# Patient Record
Sex: MALE | Race: WHITE
[De-identification: names, ages, dates, MRNs, and addresses within clinical notes are randomized per-mention and may not be internally consistent; named-entity substitution may affect disease eponyms.]

---

## 2019-12-17 ENCOUNTER — HOSPITAL ENCOUNTER (EMERGENCY)
Dept: HOSPITAL 97 - ER | Age: 44
Discharge: HOME | End: 2019-12-17
Payer: SELF-PAY

## 2019-12-17 VITALS — DIASTOLIC BLOOD PRESSURE: 81 MMHG | OXYGEN SATURATION: 100 % | SYSTOLIC BLOOD PRESSURE: 118 MMHG | TEMPERATURE: 99.8 F

## 2019-12-17 DIAGNOSIS — B02.9: Primary | ICD-10-CM

## 2019-12-17 DIAGNOSIS — Z21: ICD-10-CM

## 2019-12-17 DIAGNOSIS — F17.210: ICD-10-CM

## 2019-12-17 PROCEDURE — 99283 EMERGENCY DEPT VISIT LOW MDM: CPT

## 2019-12-17 NOTE — EDPHYS
Physician Documentation                                                                           

 [facility]                                                                 

Name: Josh Gtz                                                                              

Age: 44 yrs                                                                                       

Sex: Male                                                                                         

: 1975                                                                                   

MRN: [id number]                                                                                   

Arrival Date: 2019                                                                          

Time: 13:16                                                                                       

Account#: [id number]                                                                            

Bed 13                                                                                            

Private MD:                                                                                       

ED Physician Kaiden Seo                                                                      

HPI:                                                                                              

                                                                                             

14:06 This 44 yrs old  Male presents to ER via Ambulatory with complaints of Rash -  wa  

      on neck.                                                                                    

14:06 The patient's rash thought to be caused by an unknown cause. The rash is located on the wa  

      L scalp. L lateral neck, and over the L clavicular fossa area. The rash can be              

      described as erythematous, patchy, raised, vesicular. Onset: The symptoms/episode           

      began/occurred 2 day(s) ago. Associated signs and symptoms: Pertinent positives:            

      burning sensation, Pain Pertinent negatives: difficulty breathing, fever, itching.          

      Severity of symptoms: At their worst the symptoms were moderate in the emergency            

      department the symptoms are unchanged. Treatment given at home: none. The patient has       

      experienced a previous episode. The patient has not recently seen a physician. h/o HIV.     

      on HAART. Viral load undetectable. .                                                        

                                                                                                  

Historical:                                                                                       

- Allergies:                                                                                      

13:25 No Known Allergies;                                                                     tw2 

- Home Meds:                                                                                      

13:25 Tivicay 50 mg oral tab 1 tab once daily [Active]; norvair [Active]; Prezista oral oral  tw2 

      [Active];                                                                                   

- PMHx:                                                                                           

13:25 HIV;                                                                                    tw2 

- PSHx:                                                                                           

13:25 Appendectomy;                                                                           tw2 

                                                                                                  

- Immunization history:: Adult Immunizations Last tetanus immunization: < 5 years ago.            

- Social history:: Smoking status: Patient uses tobacco products, smokes one pack                 

  cigarettes per day. Patient uses street drugs, Methamphetamine (Meth) "i used                   

  sometime last week".                                                                            

- Ebola Screening: : Patient denies travel to an Ebola-affected area in the 21 days               

  before illness onset.                                                                           

- Family history:: not pertinent.                                                                 

- Hospitalizations: : No recent hospitalization is reported.                                      

                                                                                                  

                                                                                                  

ROS:                                                                                              

14:15 Constitutional: Negative for fever, chills, and weight loss, Eyes: Negative for injury, wa  

      pain, redness, and discharge, ENT: Negative for injury, pain, and discharge, Neck:          

      Negative for injury, pain, and swelling, Cardiovascular: Negative for chest pain,           

      palpitations, and edema, Respiratory: Negative for shortness of breath, cough,              

      wheezing, and pleuritic chest pain, Abdomen/GI: Negative for abdominal pain, nausea,        

      vomiting, diarrhea, and constipation, Back: Negative for injury and pain, MS/Extremity:     

      Negative for injury and deformity, Neuro: Negative for headache, weakness, numbness,        

      tingling, and seizure, Psych: Negative for depression, anxiety, suicide ideation,           

      homicidal ideation, and hallucinations.                                                     

14:15 Skin: Positive for rash, of the left temple, left jaw, left frontal area, left temporal     

      area, left occipital area, left ear and left base of the skull, vesicular, raised.          

      erythematous. tender to touch.                                                              

                                                                                                  

Exam:                                                                                             

14:17 Constitutional:  This is a well developed, well nourished patient who is awake, alert,  wa  

      and in no acute distress. Head/Face:  Normocephalic, atraumatic. Eyes:  Pupils equal        

      round and reactive to light, extra-ocular motions intact.  Lids and lashes normal.          

      Conjunctiva and sclera are non-icteric and not injected.  Cornea within normal limits.      

      Periorbital areas with no swelling, redness, or edema. ENT:  Nares patent. No nasal         

      discharge, no septal abnormalities noted.  Tympanic membranes are normal and external       

      auditory canals are clear.  Oropharynx with no redness, swelling, or masses, exudates,      

      or evidence of obstruction, uvula midline.  Mucous membranes moist. Neck:  Trachea          

      midline, no thyromegaly or masses palpated, and no cervical lymphadenopathy.  Supple,       

      full range of motion without nuchal rigidity, or vertebral point tenderness.  No            

      Meningismus. Chest/axilla:  Normal chest wall appearance and motion.  Nontender with no     

      deformity.  No lesions are appreciated. Cardiovascular:  Regular rate and rhythm with a     

      normal S1 and S2.  No gallops, murmurs, or rubs.  Normal PMI, no JVD.  No pulse             

      deficits. Respiratory:  Lungs have equal breath sounds bilaterally, clear to                

      auscultation and percussion.  No rales, rhonchi or wheezes noted.  No increased work of     

      breathing, no retractions or nasal flaring. Abdomen/GI:  Soft, non-tender, with normal      

      bowel sounds.  No distension or tympany.  No guarding or rebound.  No evidence of           

      tenderness throughout. Back:  No spinal tenderness.  No costovertebral tenderness.          

      Full range of motion. MS/ Extremity:  Pulses equal, no cyanosis.  Neurovascular intact.     

       Full, normal range of motion. Neuro:  Awake and alert, GCS 15, oriented to person,         

      place, time, and situation.  Cranial nerves II-XII grossly intact.  Motor strength 5/5      

      in all extremities.  Sensory grossly intact.  Cerebellar exam normal.  Normal gait.         

      Psych:  Awake, alert, with orientation to person, place and time.  Behavior, mood, and      

      affect are within normal limits.                                                            

14:17 Skin: zoster, on the left frontal area, left side of the back of head, left temporal        

      area, left occipital area, left ear, left base of the skull and left mandible.              

                                                                                                  

Vital Signs:                                                                                      

13:19 Weight 74.84 kg (R); Height 5 ft. 6 in. (167.64 cm);                                    tw2 

13:23  / 82; Pulse 112; Resp 18; Temp 100.3(TE); Pulse Ox 100% on R/A; Pain 6/10;       tw2 

14:40  / 81; Pulse 104; Resp 18; Temp 99.8; Pulse Ox 100% on R/A;                       rv  

13:19 Body Mass Index 26.63 (74.84 kg, 167.64 cm)                                             tw2 

                                                                                                  

MDM:                                                                                              

13:32 Patient medically screened.                                                             wa  

14:19 Differential diagnosis: dermatomal rash consistent with zoster. will treat accordingly. wa  

      see orders and scripts. Data reviewed: vital signs, nurses notes. ED course: lidocaine      

      gel applied to lesions for relief. prednisone. will d/c with acyclovir and advise close     

      f/u.                                                                                        

                                                                                                  

Administered Medications:                                                                         

14:09 Drug: Tylenol 1000 mg Route: PO;                                                        rv  

14:41 Follow up: Response: No adverse reaction; Temperature is decreased                      rv  

14:09 Drug: predniSONE 40 mg Route: PO;                                                       rv  

14:41 Follow up: Response: No adverse reaction                                                rv  

14:09 Drug: Viscous Lidocaine Liquid (4 %) 5 ml {Note: back of the head down to the neck,     rv  

      left side.} Route: Mucous Membrane;                                                         

14:41 Follow up: Response: Pain is decreased                                                  rv  

                                                                                                  

                                                                                                  

Disposition:                                                                                      

1719 14:28 Discharged to Home. Impression: Zoster without complications.                      

- Condition is Stable.                                                                            

- Discharge Instructions: Shingles, Easy-to-Read.                                                 

- Prescriptions for Acyclovir 800 mg Oral Tablet - take 1 tablet by ORAL route 5 times            

  per day for 10 days; 50 tablet. Prednisone 20 mg Oral Tablet - take 1 tablet by ORAL            

  route once daily for 5 days; 5 tablet. lidocaine HCl- hydrocortison ac 3-0.5 %                  

  Topical cream - apply 1 inch ribbon by TOPICAL route 2 times per day apply to                   

  affected area for pain; 1 bottle.                                                               

- Medication Reconciliation Form, Thank You Letter, Antibiotic Education, Prescription            

  Opioid Use form.                                                                                

- Follow up: Private Physician; When: 2 - 3 days; Reason: Recheck today's complaints.             

- Problem is new.                                                                                 

- Symptoms have improved.                                                                         

- Notes: please take medication as prescribed. see your doctor within 2 days for follow           

  up.                                                                                             

                                                                                                  

                                                                                                  

Signatures:                                                                                       

Bettye Reynoso RN                          RN   tw2                                                  

Kaiden Seo MD MD   wa                                                   

Sharif Baird RN                    RN   rv                                                   

                                                                                                  

Corrections: (The following items were deleted from the chart)                                    

14:44 14:28 2019 14:28 Discharged to Home. Impression: Zoster without complications.    rv  

      Condition is Stable. Forms are Medication Reconciliation Form, Thank You Letter,            

      Antibiotic Education, Prescription Opioid Use. Follow up: Private Physician; When: 2 -      

      3 days; Reason: Recheck today's complaints. Problem is new. Symptoms have improved. wa      

                                                                                                  

**************************************************************************************************

## 2019-12-17 NOTE — ER
Nurse's Notes                                                                                     

 [facility]                                                                 

Name: Josh Gtz                                                                              

Age: 44 yrs                                                                                       

Sex: Male                                                                                         

: 1975                                                                                   

MRN: [id number]                                                                                   

Arrival Date: 2019                                                                          

Time: 13:16                                                                                       

Account#: [id number]                                                                            

Bed 13                                                                                            

Private MD:                                                                                       

Diagnosis: Zoster without complications                                                           

                                                                                                  

Presentation:                                                                                     

                                                                                             

13:20 Presenting complaint: Patient states: this rash started 2 days ago, on my head and      tw2 

      neck, i had a bout with syphilis about 3 months ago and i went to my doctor and had         

      blood work done and i got the treatment and have been good for a while, a few weeks ago     

      i felt it coming on and my friend told me he got it, so about 2 weeks ago i went to the     

      doctor and got another shot, the rash on the front of my neck started yesterday and up      

      the left side of my neck and i am started to get some on my back and shoulder.              

      Transition of care: patient was not received from another setting of care. Onset of         

      symptoms was 2019. Risk Assessment: Do you want to hurt yourself or            

      someone else? Patient reports no desire to harm self or others. Initial Sepsis Screen:      

      Does the patient meet any 2 criteria? No. Patient's initial sepsis screen is negative.      

      Does the patient have a suspected source of infection? No. Patient's initial sepsis         

      screen is negative. Care prior to arrival: None.                                            

13:20 Method Of Arrival: Ambulatory                                                           tw2 

13:20 Acuity: MITCH 3                                                                           tw2 

                                                                                                  

Triage Assessment:                                                                                

13:25 General: Appears in no apparent distress. slender, Behavior is calm, cooperative,       tw2 

      appropriate for age. Pain: Complains of pain in neck, scalp and back.                       

                                                                                                  

Historical:                                                                                       

- Allergies:                                                                                      

13:25 No Known Allergies;                                                                     tw2 

- Home Meds:                                                                                      

13:25 Tivicay 50 mg oral tab 1 tab once daily [Active]; norvair [Active]; Prezista oral oral  tw2 

      [Active];                                                                                   

- PMHx:                                                                                           

13:25 HIV;                                                                                    tw2 

- PSHx:                                                                                           

13:25 Appendectomy;                                                                           tw2 

                                                                                                  

- Immunization history:: Adult Immunizations Last tetanus immunization: < 5 years ago.            

- Social history:: Smoking status: Patient uses tobacco products, smokes one pack                 

  cigarettes per day. Patient uses street drugs, Methamphetamine (Meth) "i used                   

  sometime last week".                                                                            

- Ebola Screening: : Patient denies travel to an Ebola-affected area in the 21 days               

  before illness onset.                                                                           

- Family history:: not pertinent.                                                                 

- Hospitalizations: : No recent hospitalization is reported.                                      

                                                                                                  

                                                                                                  

Screenin:19 Abuse screen: Denies threats or abuse. Nutritional screening: No deficits noted.        tw2 

      Tuberculosis screening: No symptoms or risk factors identified. Fall Risk None              

      identified.                                                                                 

                                                                                                  

Assessment:                                                                                       

14:00 General: Appears in no apparent distress. uncomfortable, Behavior is calm, cooperative. rv  

14:00 Pain: Complains of pain in back of the head down to the neck, left side. Neuro: Level   rv  

      of Consciousness is awake, alert, obeys commands, Oriented to person, place, time,          

      situation. Cardiovascular: Patient's skin is warm and dry. Respiratory: Airway is           

      patent. GI: No signs and/or symptoms were reported involving the gastrointestinal           

      system. : No signs and/or symptoms were reported regarding the genitourinary system.      

      EENT: No signs and/or symptoms were reported regarding the EENT system. Derm: Rash          

      noted that is vesicular. Musculoskeletal: No signs and/or symptoms reported regarding       

      the musculoskeletal system.                                                                 

                                                                                                  

Vital Signs:                                                                                      

13:19 Weight 74.84 kg (R); Height 5 ft. 6 in. (167.64 cm);                                    tw2 

13:23  / 82; Pulse 112; Resp 18; Temp 100.3(TE); Pulse Ox 100% on R/A; Pain 6/10;       tw2 

14:40  / 81; Pulse 104; Resp 18; Temp 99.8; Pulse Ox 100% on R/A;                       rv  

13:19 Body Mass Index 26.63 (74.84 kg, 167.64 cm)                                             tw2 

                                                                                                  

ED Course:                                                                                        

13:16 Patient arrived in ED.                                                                  am2 

13:19 Arm band placed on.                                                                     tw2 

13:23 Triage completed.                                                                       tw2 

13:32 Kaiden Seo MD is Attending Physician.                                             wa  

13:38 Odalys Maradiaga, RN is Primary Nurse.                                                   iw  

14:00 Patient has correct armband on for positive identification. Bed in low position. Call   rv  

      light in reach. Pulse ox on. NIBP on.                                                       

14:42 No provider procedures requiring assistance completed. Patient did not have IV access   rv  

      during this emergency room visit.                                                           

                                                                                                  

Administered Medications:                                                                         

14:09 Drug: Tylenol 1000 mg Route: PO;                                                        rv  

14:41 Follow up: Response: No adverse reaction; Temperature is decreased                      rv  

14:09 Drug: predniSONE 40 mg Route: PO;                                                       rv  

14:41 Follow up: Response: No adverse reaction                                                rv  

14:09 Drug: Viscous Lidocaine Liquid (4 %) 5 ml {Note: back of the head down to the neck,     rv  

      left side.} Route: Mucous Membrane;                                                         

14:41 Follow up: Response: Pain is decreased                                                  rv  

                                                                                                  

                                                                                                  

Outcome:                                                                                          

14:28 Discharge ordered by MD.                                                                wa  

14:43 Discharged to home ambulatory.                                                          rv  

14:43 Condition: good                                                                             

14:43 Discharge instructions given to patient, Instructed on discharge instructions, follow       

      up and referral plans. medication usage, Demonstrated understanding of instructions,        

      follow-up care, medications, Prescriptions given X 3.                                       

14:44 Patient left the ED.                                                                    rv  

                                                                                                  

Signatures:                                                                                       

Odalys Maradiaga, RN                     RADHAMES   iw                                                   

Bettye Reynoso RN                          RN   tw2                                                  

Petrona Padilla am2                                                  

Kaiden Seo MD MD wa Vicente, Ronaldo, RN                    RN   rv                                                   

                                                                                                  

**************************************************************************************************